# Patient Record
Sex: FEMALE | Race: WHITE | URBAN - METROPOLITAN AREA
[De-identification: names, ages, dates, MRNs, and addresses within clinical notes are randomized per-mention and may not be internally consistent; named-entity substitution may affect disease eponyms.]

---

## 2019-10-10 ENCOUNTER — IMPORTED ENCOUNTER (OUTPATIENT)
Dept: URBAN - METROPOLITAN AREA CLINIC 50 | Facility: CLINIC | Age: 79
End: 2019-10-10

## 2019-10-10 NOTE — PATIENT DISCUSSION
"""SAVANNA OU w/ allergic component. Discussed dry eye diagnosis with patient.  Educated patient on ""

## 2019-10-14 ENCOUNTER — IMPORTED ENCOUNTER (OUTPATIENT)
Dept: URBAN - METROPOLITAN AREA CLINIC 50 | Facility: CLINIC | Age: 79
End: 2019-10-14

## 2019-10-15 ENCOUNTER — IMPORTED ENCOUNTER (OUTPATIENT)
Dept: URBAN - METROPOLITAN AREA CLINIC 50 | Facility: CLINIC | Age: 79
End: 2019-10-15

## 2019-10-24 ENCOUNTER — IMPORTED ENCOUNTER (OUTPATIENT)
Dept: URBAN - METROPOLITAN AREA CLINIC 50 | Facility: CLINIC | Age: 79
End: 2019-10-24

## 2019-10-24 NOTE — PATIENT DISCUSSION
"""SAVANNA OU w/ allergic component; improving.  Continue Artificial tears both eyes three-four times a ""

## 2019-12-02 ENCOUNTER — IMPORTED ENCOUNTER (OUTPATIENT)
Dept: URBAN - METROPOLITAN AREA CLINIC 50 | Facility: CLINIC | Age: 79
End: 2019-12-02

## 2020-01-06 ENCOUNTER — IMPORTED ENCOUNTER (OUTPATIENT)
Dept: URBAN - METROPOLITAN AREA CLINIC 50 | Facility: CLINIC | Age: 80
End: 2020-01-06

## 2020-01-07 ENCOUNTER — IMPORTED ENCOUNTER (OUTPATIENT)
Dept: URBAN - METROPOLITAN AREA CLINIC 50 | Facility: CLINIC | Age: 80
End: 2020-01-07

## 2020-01-31 ENCOUNTER — IMPORTED ENCOUNTER (OUTPATIENT)
Dept: URBAN - METROPOLITAN AREA CLINIC 50 | Facility: CLINIC | Age: 80
End: 2020-01-31

## 2020-01-31 NOTE — PATIENT DISCUSSION
"""Discussed with patient the possibility for needing glasses post cataract surgery for protection ""

## 2020-02-06 ENCOUNTER — IMPORTED ENCOUNTER (OUTPATIENT)
Dept: URBAN - METROPOLITAN AREA CLINIC 50 | Facility: CLINIC | Age: 80
End: 2020-02-06

## 2020-02-06 NOTE — PATIENT DISCUSSION
"""S/P IOL OD: Sensar AAB00 21.5 (Target: [place]) +Femto/Arcs +Omidria. Continue post operative instructions and drops per schedule.  """

## 2020-02-14 ENCOUNTER — IMPORTED ENCOUNTER (OUTPATIENT)
Dept: URBAN - METROPOLITAN AREA CLINIC 50 | Facility: CLINIC | Age: 80
End: 2020-02-14

## 2020-02-14 NOTE — PATIENT DISCUSSION
"""S/P IOL OD: Sensar AAB00 21.5 (Target: [place]) +Femto/Arcs +Omidria +Hydrus. Continue post operative instructions and drops per schedule.  """

## 2020-02-20 ENCOUNTER — IMPORTED ENCOUNTER (OUTPATIENT)
Dept: URBAN - METROPOLITAN AREA CLINIC 50 | Facility: CLINIC | Age: 80
End: 2020-02-20

## 2020-02-20 NOTE — PATIENT DISCUSSION
"""S/P IOL OS: Sensar AAB00 21.0 (Target: [place]) +Femto/Arcs +Omidria. Continue post operative instructions and drops per schedule.  """

## 2020-02-28 ENCOUNTER — IMPORTED ENCOUNTER (OUTPATIENT)
Dept: URBAN - METROPOLITAN AREA CLINIC 50 | Facility: CLINIC | Age: 80
End: 2020-02-28

## 2020-02-28 NOTE — PATIENT DISCUSSION
"""Continue Preservative free tears both eyes three-four times a day ."" ""Continue Warm compresses both eyes twice a day

## 2020-02-28 NOTE — PATIENT DISCUSSION
"""S/P IOL OS: Sensar AAB00 21.0 (Target: [place]) +Femto/Arcs +Omidria +Hydrus. Continue post operative instructions and drops per schedule.  """

## 2020-03-23 ENCOUNTER — IMPORTED ENCOUNTER (OUTPATIENT)
Dept: URBAN - METROPOLITAN AREA CLINIC 50 | Facility: CLINIC | Age: 80
End: 2020-03-23

## 2020-04-01 ENCOUNTER — IMPORTED ENCOUNTER (OUTPATIENT)
Dept: URBAN - METROPOLITAN AREA CLINIC 50 | Facility: CLINIC | Age: 80
End: 2020-04-01

## 2020-05-20 ENCOUNTER — IMPORTED ENCOUNTER (OUTPATIENT)
Dept: URBAN - METROPOLITAN AREA CLINIC 50 | Facility: CLINIC | Age: 80
End: 2020-05-20

## 2020-05-20 NOTE — PATIENT DISCUSSION
"""S/P IOL OU: OD: Sensar AAB00 21.5 (Target: [place])Femto/ArcsOmidria. OS: Sensar AAB00 21.0 (Target: [place])/Arcs +Hydrus. "

## 2020-07-30 ENCOUNTER — IMPORTED ENCOUNTER (OUTPATIENT)
Dept: URBAN - METROPOLITAN AREA CLINIC 50 | Facility: CLINIC | Age: 80
End: 2020-07-30

## 2020-11-15 NOTE — PATIENT DISCUSSION
"""H/o glaucoma dx OU. IOP stable.  Patient had OCT RNFL/HVF done 1 month ago at [facility] No

## 2020-12-15 NOTE — PATIENT DISCUSSION
HIGH ASTIGMATISM, OU - DISCUSSED INCREASED RISK OF GLARE AND HALOS AROUND LIGHTS WITH A 1% CHANCE OF LIMITING EVERYDAY ACTIVITIES, ALTHOUGH UNLIKELY. PT REASSURED SHOULD THIS OCCUR WE WOULD EXHAUST ALL MEASURES TO ADDRESS.

## 2020-12-15 NOTE — PATIENT DISCUSSION
RTC 1 WK FOR MV CL TRIAL REMOVAL, WILL NEED TO SCHEDULE LM COMPLETION 3 DAYS AFTER IF PATIENT WOULD LIKE TO PROCEED.

## 2020-12-15 NOTE — PATIENT DISCUSSION
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  TREATMENT OPTION BELOW DISCUSSED:

## 2020-12-15 NOTE — PATIENT DISCUSSION
PT DESIRES TO BE LESS DEPENDENT ON GLS FOR BOTH DISTANCE AND NEAR. MONOVISION CL TRIAL DISPENSED TODAY.

## 2020-12-22 NOTE — PATIENT DISCUSSION
PATIENT HAPPY WITH MONOVISION CL TRIAL. REMOVED OD CL TODAY, NO CL IN OS - PATIENT REASSURED LIKELY CAME OUT ON OWN. PATIENT LIKED INITIAL VISION IN CLS AND WOULD LIKE TO PROCEED WITH MONOVISION LASIK.

## 2020-12-31 ENCOUNTER — IMPORTED ENCOUNTER (OUTPATIENT)
Dept: URBAN - METROPOLITAN AREA CLINIC 50 | Facility: CLINIC | Age: 80
End: 2020-12-31

## 2021-04-17 ASSESSMENT — VISUAL ACUITY
OD_SC: 20/40-2
OS_PH: 20/25
OS_PH: 20/30+1
OS_SC: 20/40-
OS_CC: 20/30-
OS_SC: 20/30-2
OS_PH: 20/30-2
OD_PH: 20/25
OD_SC: 20/30-1
OS_CC: 20/50-1
OD_PH: 20/30
OS_BAT: 20/80
OD_SC: 20/25
OD_SC: 20/25+1
OD_SC: 20/30
OD_CC: J1-
OD_BAT: 20/30-1
OD_BAT: >20/400
OS_OTHER: 20/80. 20/80.
OS_SC: 20/40-2
OS_CC: 20/40+2
OS_CC: J2
OS_SC: 20/30
OD_CC: J2@ 16 IN
OS_BAT: 20/80
OD_PH: 20/30
OS_BAT: 20/80
OD_SC: 20/40+2
OD_CC: 20/40+1
OS_PH: 20/30
OS_OTHER: 20/30. 20/40.
OS_PH: 20/25-2
OD_PH: 20/25-1
OD_CC: 20/30-
OS_CC: J1-
OD_OTHER: 20/30-1. 20/40-1.
OS_SC: 20/40-2
OD_SC: 20/25+1
OS_BAT: 20/30
OD_CC: 20/30-
OS_SC: 20/40-2
OS_CC: J2@ 16 IN
OS_CC: 20/30-
OD_BAT: >20/400
OS_OTHER: 20/80. 20/80.
OD_CC: J2

## 2021-04-17 ASSESSMENT — TONOMETRY
OD_IOP_MMHG: 12
OS_IOP_MMHG: 12
OD_IOP_MMHG: 16
OD_IOP_MMHG: 13
OD_IOP_MMHG: 15
OS_IOP_MMHG: 13
OS_IOP_MMHG: 09
OS_IOP_MMHG: 14
OD_IOP_MMHG: 12
OD_IOP_MMHG: 13
OS_IOP_MMHG: 11
OS_IOP_MMHG: 9
OS_IOP_MMHG: 16
OD_IOP_MMHG: 12
OD_IOP_MMHG: 13
OS_IOP_MMHG: 11
OS_IOP_MMHG: 12
OS_IOP_MMHG: 7
OS_IOP_MMHG: 12
OD_IOP_MMHG: 11
OS_IOP_MMHG: 14
OD_IOP_MMHG: 11
OS_IOP_MMHG: 11
OD_IOP_MMHG: 14
OD_IOP_MMHG: 12
OD_IOP_MMHG: 12
OD_IOP_MMHG: 13
OD_IOP_MMHG: 12
OS_IOP_MMHG: 13
OS_IOP_MMHG: 11
OS_IOP_MMHG: 10
OS_IOP_MMHG: 13
OD_IOP_MMHG: 14
OD_IOP_MMHG: 12
OD_IOP_MMHG: 11
OS_IOP_MMHG: 11

## 2021-04-17 ASSESSMENT — PACHYMETRY
OS_CT_UM: 579
OD_CT_UM: 573
OD_CT_UM: 573
OS_CT_UM: 579
OS_CT_UM: 579
OD_CT_UM: 573
OS_CT_UM: 579
OD_CT_UM: 573
OS_CT_UM: 579
OD_CT_UM: 573
OS_CT_UM: 579
OD_CT_UM: 573
OS_CT_UM: 579

## 2021-06-25 ENCOUNTER — PREPPED CHART (OUTPATIENT)
Dept: URBAN - METROPOLITAN AREA CLINIC 48 | Facility: CLINIC | Age: 81
End: 2021-06-25

## 2021-07-01 ENCOUNTER — COMPREHENSIVE EXAM (OUTPATIENT)
Dept: URBAN - METROPOLITAN AREA CLINIC 48 | Facility: CLINIC | Age: 81
End: 2021-07-01

## 2021-07-01 DIAGNOSIS — H40.1131: ICD-10-CM

## 2021-07-01 DIAGNOSIS — H04.123: ICD-10-CM

## 2021-07-01 DIAGNOSIS — H26.493: ICD-10-CM

## 2021-07-01 DIAGNOSIS — H52.4: ICD-10-CM

## 2021-07-01 PROCEDURE — 92083 EXTENDED VISUAL FIELD XM: CPT

## 2021-07-01 PROCEDURE — 92133 CPTRZD OPH DX IMG PST SGM ON: CPT

## 2021-07-01 PROCEDURE — 92014 COMPRE OPH EXAM EST PT 1/>: CPT

## 2021-07-01 PROCEDURE — 92015 DETERMINE REFRACTIVE STATE: CPT

## 2021-07-01 ASSESSMENT — VISUAL ACUITY
OD_SC: 20/25
OU_SC: 20/25
OS_SC: 20/25

## 2021-07-01 ASSESSMENT — TONOMETRY
OD_IOP_MMHG: 13
OS_IOP_MMHG: 12
OS_IOP_MMHG: 11
OD_IOP_MMHG: 12

## 2021-07-01 NOTE — PATIENT DISCUSSION
IOP stable 13/12; last visit 14 OU. TMax unknown. S/P Hydrus OU. Gonio  (12/2020) showed open angles to grade 4 OD/OS. OCT RNFL/HVF today stable to last. Brimonidine bid OU stopped by [facility] on 04/01/2020. Continue Latanoprost both eyes at bedtime. RTC 6 months for IOP Check with HVF/OCT(RNFL).

## 2022-01-06 ENCOUNTER — FOLLOW UP (OUTPATIENT)
Dept: URBAN - METROPOLITAN AREA CLINIC 48 | Facility: CLINIC | Age: 82
End: 2022-01-06

## 2022-01-06 DIAGNOSIS — H40.1131: ICD-10-CM

## 2022-01-06 DIAGNOSIS — H04.123: ICD-10-CM

## 2022-01-06 PROCEDURE — 92083 EXTENDED VISUAL FIELD XM: CPT

## 2022-01-06 PROCEDURE — 92012 INTRM OPH EXAM EST PATIENT: CPT

## 2022-01-06 PROCEDURE — 92133 CPTRZD OPH DX IMG PST SGM ON: CPT

## 2022-01-06 ASSESSMENT — VISUAL ACUITY
OS_SC: 20/30-2
OD_SC: 20/30-2

## 2022-01-06 ASSESSMENT — TONOMETRY
OS_IOP_MMHG: 11
OD_IOP_MMHG: 13
OS_IOP_MMHG: 13
OD_IOP_MMHG: 12

## 2022-01-06 NOTE — PATIENT DISCUSSION
IOP stable 13/13 ; last visit 13/12 OU. TMax unknown. S/P Hydrus OU. Gonio (12/2020) showed open angles to grade 4 OD/OS. OCT RNFL/HVF today stable to last. Brimonidine bid OU stopped by [facility] on 04/01/2020. Continue Latanoprost both eyes at bedtime.

## 2022-01-06 NOTE — PATIENT DISCUSSION
Discussed with patient that Latanoprost can cause increased dryness. Discussed drop regiment with the patient and gave her drop instructions. Recommended patient to use PFAT 3-4x/day. Recommended warm compresses OU bid x 10 minutes at a time. Use lid scrubs OU bid after warm compresses.

## 2022-06-13 NOTE — PATIENT DISCUSSION
[facility] monitoring, AREDS2 vitamins, no smoking, green leafy vegetables discussed.

## 2022-07-25 ENCOUNTER — COMPREHENSIVE EXAM (OUTPATIENT)
Dept: URBAN - METROPOLITAN AREA CLINIC 48 | Facility: CLINIC | Age: 82
End: 2022-07-25

## 2022-07-25 DIAGNOSIS — Z98.890: ICD-10-CM

## 2022-07-25 DIAGNOSIS — H26.493: ICD-10-CM

## 2022-07-25 DIAGNOSIS — H40.1131: ICD-10-CM

## 2022-07-25 PROCEDURE — 92015 DETERMINE REFRACTIVE STATE: CPT

## 2022-07-25 PROCEDURE — 92014 COMPRE OPH EXAM EST PT 1/>: CPT

## 2022-07-25 ASSESSMENT — TONOMETRY
OS_IOP_MMHG: 12
OD_IOP_MMHG: 12
OS_IOP_MMHG: 10
OD_IOP_MMHG: 11

## 2022-07-25 ASSESSMENT — VISUAL ACUITY
OD_CC: 20/60-1
OU_CC: J1 @16IN
OD_PH: 20/40-1
OD_GLARE: 20/80
OS_CC: 20/40
OS_GLARE: 20/80
OS_GLARE: 20/60

## 2022-07-25 NOTE — PATIENT DISCUSSION
IOP 12/12; last visit 13/13 ; last visit 13/12 OU. TMax unknown. S/P Hydrus OU. Gonio (12/2020) showed open angles to grade 4 OD/OS. OCT RNFL/HVF (01/06/2022) stable to last. (+)Fam hx (Siblings). Brimonidine bid OU stopped by [facility] on 04/01/2020. Continue Latanoprost both eyes at bedtime.

## 2022-07-25 NOTE — PATIENT DISCUSSION
PCO ([id number]): Visually significant PCO present on exam today. Recommend YAG laser capsulotomy to improve vision and decrease glare symptoms. RBAs of procedure discussed. Patient agrees and wishes to proceed.

## 2022-08-08 NOTE — PATIENT DISCUSSION
[facility] monitoring, AREDS2 vitamins, no smoking, green leafy vegetables discussed.

## 2022-09-06 NOTE — PATIENT DISCUSSION
"""s/p Hydrus OU. IOP stable on current drop therapy.  At 4 week post op will start trial off "" Detail Level: Simple Detail Level: Generalized Detail Level: Zone

## 2022-10-11 ENCOUNTER — CLINIC PROCEDURE ONLY (OUTPATIENT)
Dept: URBAN - METROPOLITAN AREA CLINIC 49 | Facility: CLINIC | Age: 82
End: 2022-10-11

## 2022-10-11 DIAGNOSIS — H26.491: ICD-10-CM

## 2022-10-11 PROCEDURE — 66821 AFTER CATARACT LASER SURGERY: CPT

## 2022-10-11 RX ORDER — PREDNISOLONE ACETATE 10 MG/ML
1 SUSPENSION/ DROPS OPHTHALMIC TWICE A DAY
Start: 2022-10-11

## 2022-10-11 ASSESSMENT — VISUAL ACUITY
OS_CC: 20/25-
OD_CC: 20/40

## 2022-10-11 ASSESSMENT — TONOMETRY
OD_IOP_MMHG: 12
OS_IOP_MMHG: 12
OS_IOP_MMHG: 10
OD_IOP_MMHG: 11

## 2022-10-11 NOTE — PATIENT DISCUSSION
IOP 12/12; last visit 13/13 ; last visit 13/12 OU. TMax unknown. S/P Hydrus OU. Gonio (12/2020) showed open angles to grade 4 OD/OS. OCT RNFL/HVF (01/06/2022) stable to last. (+)Fam hx (Siblings). Brimonidine bid OU stopped by [facility] on 04/01/2020. Continue Latanoprost both eyes at bedtime.

## 2022-10-11 NOTE — PROCEDURE NOTE: CLINICAL
PROCEDURE NOTE: YAG Capsulotomy OD. Diagnosis: Posterior Capsular Opacification (PCO). Prior to treatment, the risks/benefits/alternatives were discussed. The patient wished to proceed with procedure. Consent was signed. Proparacaine and brominidine were placed into the operative eye after the eye was dilated. Power = 1.1mJ. Number of pulses = 37. Patient tolerated procedure well and there were no complications. Post Laser instructions given. Krish Weeks

## 2022-10-11 NOTE — PATIENT DISCUSSION
Yag Cap OD performed today with patient's signed consent. Will start patient on Prednisolone Acetate BID x7 days, then QD x7 days, then stop. Patient to follow up with Dr. Junior Krishnamurthy as scheduled.

## 2022-10-26 ENCOUNTER — POST-OP (OUTPATIENT)
Dept: URBAN - METROPOLITAN AREA CLINIC 48 | Facility: LOCATION | Age: 82
End: 2022-10-26

## 2022-10-26 DIAGNOSIS — H26.492: ICD-10-CM

## 2022-10-26 DIAGNOSIS — H40.1131: ICD-10-CM

## 2022-10-26 DIAGNOSIS — Z98.890: ICD-10-CM

## 2022-10-26 DIAGNOSIS — H04.123: ICD-10-CM

## 2022-10-26 DIAGNOSIS — H43.813: ICD-10-CM

## 2022-10-26 DIAGNOSIS — H35.3111: ICD-10-CM

## 2022-10-26 PROCEDURE — 99024 POSTOP FOLLOW-UP VISIT: CPT

## 2022-10-26 ASSESSMENT — TONOMETRY
OS_IOP_MMHG: 12
OD_IOP_MMHG: 12
OD_IOP_MMHG: 11
OS_IOP_MMHG: 10

## 2022-10-26 ASSESSMENT — VISUAL ACUITY
OD_SC: 20/30-2
OS_SC: 20/40+2

## 2022-10-26 NOTE — PATIENT DISCUSSION
IOP 12/12; last visit 12/12. TMax unknown. S/P Hydrus OU. Gonio (12/2020) showed open angles to grade 4 OD/OS. OCT RNFL/HVF (01/06/2022) stable to last. (+)Fam hx (Siblings). Brimonidine bid OU stopped by [facility] on 04/01/2020. Continue Latanoprost both eyes at bedtime.

## 2023-01-24 ENCOUNTER — DIAGNOSTICS ONLY (OUTPATIENT)
Dept: URBAN - METROPOLITAN AREA CLINIC 48 | Facility: LOCATION | Age: 83
End: 2023-01-24

## 2023-01-24 DIAGNOSIS — H40.1131: ICD-10-CM

## 2023-01-24 PROCEDURE — 92133 CPTRZD OPH DX IMG PST SGM ON: CPT

## 2023-01-24 PROCEDURE — 92083 EXTENDED VISUAL FIELD XM: CPT

## 2023-01-24 PROCEDURE — 92012 INTRM OPH EXAM EST PATIENT: CPT

## 2023-01-24 ASSESSMENT — TONOMETRY
OS_IOP_MMHG: 11
OS_IOP_MMHG: 9
OD_IOP_MMHG: 10
OD_IOP_MMHG: 11

## 2023-01-24 ASSESSMENT — VISUAL ACUITY
OD_CC: 20/30-2
OS_CC: 20/25

## 2023-01-24 NOTE — PATIENT DISCUSSION
IOP 11/11; last visit 12/12. TMax unknown. S/P Hydrus OU. Gonio (12/2020) showed open angles OD/OS. . (+)Fam hx (Siblings). Brimonidine bid OU stopped by [facility] on 04/01/2020. OCT RNFL/HVF (01/24/2023) stable OD/OS. Continue Latanoprost both eyes at bedtime.

## 2023-07-19 ENCOUNTER — COMPREHENSIVE EXAM (OUTPATIENT)
Dept: URBAN - METROPOLITAN AREA CLINIC 48 | Facility: LOCATION | Age: 83
End: 2023-07-19

## 2023-07-19 DIAGNOSIS — H26.492: ICD-10-CM

## 2023-07-19 DIAGNOSIS — H52.4: ICD-10-CM

## 2023-07-19 DIAGNOSIS — H40.1131: ICD-10-CM

## 2023-07-19 DIAGNOSIS — H43.811: ICD-10-CM

## 2023-07-19 DIAGNOSIS — H04.123: ICD-10-CM

## 2023-07-19 DIAGNOSIS — H35.3111: ICD-10-CM

## 2023-07-19 PROCEDURE — 92134 CPTRZ OPH DX IMG PST SGM RTA: CPT

## 2023-07-19 PROCEDURE — 92015 DETERMINE REFRACTIVE STATE: CPT

## 2023-07-19 PROCEDURE — 92014 COMPRE OPH EXAM EST PT 1/>: CPT

## 2023-07-19 ASSESSMENT — KERATOMETRY
OD_AXISANGLE2_DEGREES: 90
OS_K1POWER_DIOPTERS: 43.25
OD_K2POWER_DIOPTERS: 43.50
OD_K1POWER_DIOPTERS: 43.25
OS_AXISANGLE2_DEGREES: 85
OD_AXISANGLE_DEGREES: 180
OS_K2POWER_DIOPTERS: 43.75
OS_AXISANGLE_DEGREES: 175

## 2023-07-19 ASSESSMENT — TONOMETRY
OS_IOP_MMHG: 11
OS_IOP_MMHG: 09
OD_IOP_MMHG: 11
OD_IOP_MMHG: 10

## 2023-07-19 ASSESSMENT — VISUAL ACUITY
OU_SC: J2+
OS_GLARE: 20/30
OS_GLARE: 20/30
OS_SC: 20/25+2
OD_SC: 20/20-2

## 2024-01-24 ENCOUNTER — FOLLOW UP (OUTPATIENT)
Dept: URBAN - METROPOLITAN AREA CLINIC 48 | Facility: LOCATION | Age: 84
End: 2024-01-24

## 2024-01-24 DIAGNOSIS — H40.1131: ICD-10-CM

## 2024-01-24 DIAGNOSIS — H04.123: ICD-10-CM

## 2024-01-24 PROCEDURE — 99214 OFFICE O/P EST MOD 30 MIN: CPT

## 2024-01-24 PROCEDURE — 92133 CPTRZD OPH DX IMG PST SGM ON: CPT

## 2024-01-24 PROCEDURE — 92083 EXTENDED VISUAL FIELD XM: CPT

## 2024-01-24 ASSESSMENT — TONOMETRY
OS_IOP_MMHG: 10
OS_IOP_MMHG: 12
OD_IOP_MMHG: 11
OD_IOP_MMHG: 12

## 2024-01-24 ASSESSMENT — VISUAL ACUITY
OD_SC: 20/25
OS_SC: 20/25

## 2024-08-14 ENCOUNTER — COMPREHENSIVE EXAM (OUTPATIENT)
Dept: URBAN - METROPOLITAN AREA CLINIC 53 | Facility: CLINIC | Age: 84
End: 2024-08-14

## 2024-08-14 DIAGNOSIS — H35.3111: ICD-10-CM

## 2024-08-14 DIAGNOSIS — H26.492: ICD-10-CM

## 2024-08-14 DIAGNOSIS — H43.813: ICD-10-CM

## 2024-08-14 DIAGNOSIS — H40.1131: ICD-10-CM

## 2024-08-14 PROCEDURE — 92134 CPTRZ OPH DX IMG PST SGM RTA: CPT

## 2024-08-14 PROCEDURE — 99214 OFFICE O/P EST MOD 30 MIN: CPT

## 2024-08-14 ASSESSMENT — TONOMETRY
OD_IOP_MMHG: 16
OS_IOP_MMHG: 12
OS_IOP_MMHG: 14
OD_IOP_MMHG: 15

## 2024-08-14 ASSESSMENT — VISUAL ACUITY
OU_CC: 20/20"16IN
OS_GLARE: 20/25
OD_SC: 20/25-1
OS_SC: 20/20-2
OU_SC: 20/25
OS_GLARE: 20/25

## 2025-03-14 ENCOUNTER — DIAGNOSTICS ONLY (OUTPATIENT)
Age: 85
End: 2025-03-14

## 2025-03-14 DIAGNOSIS — H40.1131: ICD-10-CM

## 2025-03-14 PROCEDURE — 92133 CPTRZD OPH DX IMG PST SGM ON: CPT

## 2025-03-14 PROCEDURE — 92083 EXTENDED VISUAL FIELD XM: CPT

## 2025-03-26 ENCOUNTER — FOLLOW UP (OUTPATIENT)
Age: 85
End: 2025-03-26

## 2025-03-26 DIAGNOSIS — H40.1131: ICD-10-CM

## 2025-03-26 PROCEDURE — 99213 OFFICE O/P EST LOW 20 MIN: CPT
